# Patient Record
Sex: FEMALE | Employment: PART TIME | URBAN - NONMETROPOLITAN AREA
[De-identification: names, ages, dates, MRNs, and addresses within clinical notes are randomized per-mention and may not be internally consistent; named-entity substitution may affect disease eponyms.]

---
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
## 2025-06-24 ENCOUNTER — OFFICE VISIT (OUTPATIENT)
Dept: URGENT CARE | Facility: PHYSICIAN GROUP | Age: 20
End: 2025-06-24
Payer: COMMERCIAL

## 2025-06-24 ENCOUNTER — RESULTS FOLLOW-UP (OUTPATIENT)
Dept: URGENT CARE | Facility: PHYSICIAN GROUP | Age: 20
End: 2025-06-24

## 2025-06-24 ENCOUNTER — APPOINTMENT (OUTPATIENT)
Dept: RADIOLOGY | Facility: IMAGING CENTER | Age: 20
End: 2025-06-24
Payer: COMMERCIAL

## 2025-06-24 VITALS
OXYGEN SATURATION: 100 % | SYSTOLIC BLOOD PRESSURE: 108 MMHG | HEIGHT: 59 IN | HEART RATE: 62 BPM | BODY MASS INDEX: 28.02 KG/M2 | WEIGHT: 139 LBS | RESPIRATION RATE: 16 BRPM | DIASTOLIC BLOOD PRESSURE: 58 MMHG | TEMPERATURE: 98.2 F

## 2025-06-24 DIAGNOSIS — S99.911A INJURY OF RIGHT ANKLE, INITIAL ENCOUNTER: Primary | ICD-10-CM

## 2025-06-24 PROCEDURE — 73610 X-RAY EXAM OF ANKLE: CPT | Mod: TC,FY,RT

## 2025-06-24 ASSESSMENT — ENCOUNTER SYMPTOMS
EYES NEGATIVE: 1
COUGH: 0
NEUROLOGICAL NEGATIVE: 1
SHORTNESS OF BREATH: 0
CHILLS: 0
FEVER: 0
GASTROINTESTINAL NEGATIVE: 1

## 2025-06-25 NOTE — PROGRESS NOTES
"Subjective:     Chief Complaint   Patient presents with    Ankle Injury     X 3 wks   Stepped in pot hole twisted ankle, bruising and swelling. Not healing. Has been wearing a boot for 2 wks.        HPI:  Genesis Orr is a 20 y.o. female who presents for R ankle injury 3 weeks ago. She states she stepped in pothole and rolled ankle. She has been wearing a brace, and then started using a walking boot. Using ibuprofen PRN with ok relief. Pain 4/10. Denies numbness or tingling.     ROS:  Review of Systems   Constitutional:  Negative for chills and fever.   HENT: Negative.     Eyes: Negative.    Respiratory:  Negative for cough and shortness of breath.    Cardiovascular:  Negative for chest pain.   Gastrointestinal: Negative.    Genitourinary: Negative.    Musculoskeletal:  Positive for joint pain (R ankle).   Skin:  Negative for rash.   Neurological: Negative.    All other systems reviewed and are negative.       CURRENT MEDICATIONS:  Encounter Medications with Dispense Information[1]    ALLERGIES:   Allergies[2]    PROBLEM LIST:    does not have any pertinent problems on file.    Allergies, Medications, & Tobacco/Substance Use were reconciled by the Medical Assistant and reviewed by myself.     Objective:   /58   Pulse 62   Temp 36.8 °C (98.2 °F) (Temporal)   Resp 16   Ht 1.499 m (4' 11\")   Wt 63 kg (139 lb)   SpO2 100%   BMI 28.07 kg/m²     Physical Exam  Constitutional:       General: She is not in acute distress.     Appearance: She is not ill-appearing or toxic-appearing.   Cardiovascular:      Rate and Rhythm: Normal rate and regular rhythm.   Pulmonary:      Effort: Pulmonary effort is normal.      Breath sounds: Normal breath sounds.   Musculoskeletal:      Right ankle: Swelling present. No deformity. Tenderness present over the lateral malleolus. Decreased range of motion. Normal pulse.      Left ankle: Normal.   Skin:     General: Skin is warm and dry.   Neurological:      Mental Status: She " is alert.       Assessment/Plan:   Pt's history and physical exam consistent with R ankle injury. Bruising and swelling has improved but she still has pain with certain movements of ankle. Xray is negative. Supportive care and return precautions discussed.     Assessment & Plan  Injury of right ankle, initial encounter  Orders:    DX-ANKLE 3+ VIEWS RIGHT    Referral to Physical Therapy    Walking Boot  - Encouraged to take OTC ibuprofen or tylenol (if no contraindications) per package instructions for pain for 7 days or until symptoms improve.   - Pt educated to use ice and do gentle ROM stretches and exercises.   - Pt encouraged to resume activity as tolerated, rest as needed, but stay active as much as possible.   - Education provided to follow up with PCP as needed and to seek care if pain does not improve in 4 weeks, numbness/tingling or any other worsening concerns.     Discussed differential diagnosis, management options, risks/benefits, and alternatives to planned treatment. Pt expressed understanding and the treatment plan was agreed upon. Questions were encouraged and answered. Pt encouraged to return to urgent care as needed if new or worsening symptoms or if there is no improvement in condition. Pt educated in red flags and indications to immediately call 911 or present to the Emergency Department. Advised the patient to follow-up with the primary care physician for recheck, reevaluation, and further management.    I personally reviewed prior external notes and test results pertinent to today's visit. I have independently reviewed and interpreted all diagnostics ordered during this visit.    Please note that this dictation was created using voice recognition software. I have made a reasonable attempt to correct obvious errors, but I expect that there are errors of grammar and possibly content that I did not discover before finalizing the note.    This note was electronically signed by ANGELIQUE Chanel          [1]   No current outpatient medications on file.   [2]   Allergies  Allergen Reactions    Azithromycin Nausea

## 2025-07-01 NOTE — Clinical Note
REFERRAL APPROVAL NOTICE         Sent on July 1, 2025                   Genesis Orr  [address] [place] [address]                   Dear MsPietro Dominga,    After a careful review of the medical information and benefit coverage, [facility] has processed your referral. See below for additional details.    If applicable, you must be actively enrolled with your insurance for coverage of the authorized service. If you have any questions regarding your coverage, please contact your insurance directly.    REFERRAL INFORMATION   Referral #:  [id number]  Referred-To Provider    Referred-By Provider:  Physical Therapy    NATY Olivas   [facility]      [address]  [address]  [place] [address]  [phone] [address] [place] [address]  [phone]    Referral Start Date:  06/24/2025  Referral End Date:   06/24/2026             SCHEDULING  If you do not already have an appointment, please call [phone] to make an appointment.     MORE INFORMATION  If you do not already have a [employer] account, sign up at: [email or web address]  You can access your medical information, make appointments, see lab results, billing information, and more.  If you have questions regarding this referral, please contact  the [facility] Referrals department at:             [phone]. Monday - Friday 8:00AM - 5:00PM.     Sincerely,    [facility]

## 2025-07-01 NOTE — Clinical Note
REFERRAL APPROVAL NOTICE         Sent on July 1, 2025                   Genesis Orr  [address]  [facility] [address]                   Dear Ms. Orr,    After a careful review of the medical information and benefit coverage, [facility] has processed your referral. See below for additional details.    If applicable, you must be actively enrolled with your insurance for coverage of the authorized service. If you have any questions regarding your coverage, please contact your insurance directly.    REFERRAL INFORMATION   Referral #:  [id number]  Referred-To Department    Referred-By Provider:  Sports Medicine    NATY Olivas   [facility] [address]      [address]  [address]  [facility] [id number] [phone] [address] [address] [phone]    Referral Start Date:  06/24/2025  Referral End Date:   06/24/2026             SCHEDULING  If you do not already have an appointment, please call [phone] to make an appointment.     MORE INFORMATION  If you do not already have a "[employer] account, sign up at: [email or web address]  You can access your medical information, make appointments, see lab results, billing information, and more.  If you have questions regarding this referral, please contact  the [facility] Referrals department at:             [phone]. Monday - Friday 8:00AM - 5:00PM.     Sincerely,    [facility]

## 2025-07-02 ENCOUNTER — TELEPHONE (OUTPATIENT)
Dept: SCHEDULING | Facility: IMAGING CENTER | Age: 20
End: 2025-07-02
Payer: COMMERCIAL